# Patient Record
Sex: MALE | Race: WHITE
[De-identification: names, ages, dates, MRNs, and addresses within clinical notes are randomized per-mention and may not be internally consistent; named-entity substitution may affect disease eponyms.]

---

## 2021-01-26 ENCOUNTER — HOSPITAL ENCOUNTER (EMERGENCY)
Dept: HOSPITAL 54 - ER | Age: 21
Discharge: HOME | End: 2021-01-26
Payer: COMMERCIAL

## 2021-01-26 VITALS — DIASTOLIC BLOOD PRESSURE: 81 MMHG | SYSTOLIC BLOOD PRESSURE: 128 MMHG

## 2021-01-26 VITALS — WEIGHT: 230 LBS | HEIGHT: 67 IN | BODY MASS INDEX: 36.1 KG/M2
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
## 2021-01-26 DIAGNOSIS — S61.213A: Primary | ICD-10-CM
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
## 2021-01-26 NOTE — NUR
Patient came in to the er c/o lac on the left middle finger. On room air, 
breathing evenly and unlabored. Kept comfortable, will continue to monitor 
accordingly.